# Patient Record
Sex: FEMALE | Race: WHITE | Employment: UNEMPLOYED | URBAN - METROPOLITAN AREA
[De-identification: names, ages, dates, MRNs, and addresses within clinical notes are randomized per-mention and may not be internally consistent; named-entity substitution may affect disease eponyms.]

---

## 2017-11-03 ENCOUNTER — HOSPITAL ENCOUNTER (OUTPATIENT)
Dept: PHYSICAL THERAPY | Age: 22
Discharge: HOME | End: 2017-11-03
Payer: OTHER GOVERNMENT

## 2017-11-03 PROCEDURE — 97161 PT EVAL LOW COMPLEX 20 MIN: CPT

## 2017-11-03 PROCEDURE — 97140 MANUAL THERAPY 1/> REGIONS: CPT

## 2017-11-03 PROCEDURE — 97110 THERAPEUTIC EXERCISES: CPT

## 2017-11-03 NOTE — PROGRESS NOTES
PHYSICAL THERAPY - DAILY TREATMENT NOTE    Patient Name: Nelson Kemp        Date: 11/3/2017  : 1995   YES Patient  Verified  Visit #:     Insurance: Payor:  / Plan: Oj Nevarez DEPENDENTS / Product Type:  /      In time: 6059 Out time: 1100   Total Treatment Time: 30     TREATMENT AREA =  TMD     SUBJECTIVE  Pain Level (on 0 to 10 scale):  ie  / 10   Medication Changes/New allergies or changes in medical history, any new surgeries or procedures?     NO    If yes, update Summary List   Subjective Functional Status/Changes:  []  No changes reported     See POC          OBJECTIVE  Modality rationale:      min [] Estim, type:                                          []  att     []  unatt     []  w/US     []  w/ice    []  w/heat    min []  Mechanical Traction: type/lbs                                               []  pro   []  sup   []  int   []  cont    min []  Ultrasound, settings/location:      min []  Iontophoresis:  []  take home patch w/ dexamethazone    min                                []  in clinic w/ dexamethazone    min []  Ice     []  Heat     position:     min []  Other:      10 Min Manual Therapy: DTM to (R) suboccipitals, (L) mid cervical paraspinals, (R) UT/Ls, (L) masseter, MFR to (L) masseter      min Therapeutic Exercise:  [x]  See flow sheet   []  Other:      []  Added:     to improve (function):    []  Changed:     to improve (function):      8 min Patient Education:  YES  Reviewed HEP   []  Progressed/Changed HEP based on:   Reviewed HEP for lateral deviation, mandibular depression with lingual support, self massage to (L) masseter     Other Objective/Functional Measures:    See POC     Post Treatment Pain Level (on 0 to 10) scale:   ie  / 10     ASSESSMENT  []  See Progress Note/Recertification   Patient will continue to benefit from skilled therapy to address remaining functional deficits: Decreased ability to eat hard foods, carry on prolonged conversations   Progress toward goals / Updated goals:    See POC     PLAN  []  Upgrade activities as tolerated YES Continue plan of care   []  Discharge due to :    []  Other:      Therapist: Teto Pitts DPT, CIMT    Date: 11/3/2017 Time: 11:15 AM

## 2017-11-03 NOTE — PROGRESS NOTES
[facility] PHYSICAL THERAPY AT [facility] [address] [place], [address] - Phone: [phone]  Fax: [phone] / [address] [facility]  Patient Name: Bimal Landeros May : 1995   Treatment   Diagnosis: (R) UT pain, TMD Medical   Diagnosis: Trapezius strain [X60.037S]  TMJ (dislocation of temporomandibular joint) [S03.00XA]   Onset Date: 2016     Referral Source: Chiradha, Not On File, MD [place] [facility]): 11/3/2017   Prior Hospitalization: See medical history Provider #: [id number]   Prior Level of Function: No limitations with chewing food, holding conversations   Comorbidities: Hypothyroidism, tooth extraction, wisdom teeth extraction, tonsillectomy   Medications: Verified on Patient Summary List   The Plan of Care and following information is based on the information from the initial evaluation.   ==================================================================================  Assessment / key information:  Pt is a 19 yo female s/p incidence of TMD s/s approx 2-3 months ago after being fitted improperly for dental appliance. She presents with pain ranging from 2-9/10, located (L) TMD.  Pain is made worse with activity, prolonged talking, eating food, better with rest, distractions. C/S AROM: flexion 45 deg , extension 50 deg with mid cervical pain, LSB 15 deg, RSB 20 deg , LRotation 85 deg RRotation 90 deg . Palpation reveals TTP increase to (R) UT/LS, (L) masseter. Medial pterygoid, (L) TMD joint line. Posture mild forward head, loss of cervical lordosis Demonstrates mandibular depression of 20mm, (R) lateral deviation to 10mm, (L) lateral deviation to 15 mm. C/o (L) ear fullness - currently being seen by ENT for these s/s. Pt will benefit from PT interventions to address the aforementioned deficits and allow pt to return to OF.   Eval Complexity: History HIGH Complexity :3+ comorbidities / personal factors will impact the outcome/ POC ;  Examination  LOW Complexity : 1-2 Standardized tests and measures addressing body structure, function, activity limitation and / or participation in recreation ; Presentation LOW Complexity : Stable, uncomplicated ;  Decision Making MEDIUM Complexity : FOTO score of 26-74; Overall Complexity LOW     ==================================================================================  Problem List: pain affecting function, decrease ROM, decrease ADL/ functional abilitiies, decrease activity tolerance and decrease flexibility/ joint mobility   Treatment Plan may include any combination of the following: Therapeutic exercise, Therapeutic activities, Neuromuscular re-education, Physical agent/modality and Manual therapy  Patient / Family readiness to learn indicated by: asking questions, trying to perform skills and interest  Persons(s) to be included in education: patient (P)  Barriers to Learning/Limitations: no  Measures taken:    Patient Goal (s): Decrease pain and improve function   Patient self reported health status: good  Rehabilitation Potential: good   Short Term Goals: To be accomplished in  2  weeks:  1. Pt will be independent and compliant with HEP to decrease pain, increase ROM and return pt to PLOF. 2. Pt will note pain less than or equal to 5/10 at worst to allow increase in functional abilities. 3. Pt will demonstrate increase in mandibular depression to approx 30mm or 3 fingers to allow ability to eat a variety of foods   Long Term Goals: To be accomplished in  4  weeks:  1. Pt will be independent with Clenching Cessation program to allow reduced incidence of TMJ s/s  2. Pt will have full, pain-free AROM of (B) TMJ joints in all planes to increase to return to previous eating habits. 3. Pt will note < or = 2/10 pain with all mobility to improve comfort with ADLs.    Frequency / Duration:   Patient to be seen  2-3  times per week for 4 weeks:  Patient / Caregiver education and instruction: self care, activity modification and exercises  G-Codes (GP): na  Therapist Signature: Yong FRYET, CIMT Date: 83/5/8710   Certification Period: na Time: 11:17 AM   ===========================================================================================  I certify that the above Physical Therapy Services are being furnished while the patient is under my care. I agree with the treatment plan and certify that this therapy is necessary. Physician Signature:        Date:       Time:     Please sign and return to [facility] or you may fax the signed copy to [phone]. Thank you.

## 2017-11-07 ENCOUNTER — HOSPITAL ENCOUNTER (OUTPATIENT)
Dept: PHYSICAL THERAPY | Age: 22
Discharge: HOME | End: 2017-11-07
Payer: OTHER GOVERNMENT

## 2017-11-07 PROCEDURE — 97140 MANUAL THERAPY 1/> REGIONS: CPT

## 2017-11-07 PROCEDURE — 97110 THERAPEUTIC EXERCISES: CPT

## 2017-11-07 NOTE — PROGRESS NOTES
PHYSICAL THERAPY - DAILY TREATMENT NOTE      Patient Name: Huy Barahona        Date: 2017  : 1995   YES Patient  Verified  Visit #:   2   Of  12  Insurance: Payor:  / Plan: [employer] [facility] [address] AND DEPENDENTS / Product Type:  /      In time: 10:05 Out time: 10:45   Total Treatment Time: 40     Medicare Time Tracking (below)   Total Timed Codes (min):  n/a 1:1 Treatment Time:  n/a     TREATMENT AREA =  Trapezius strain [L87.804H]  TMJ (dislocation of temporomandibular joint) [S03.00XA]    SUBJECTIVE    Pain Level (on 0 to 10 scale):  3  / 10   Medication Changes/New allergies or changes in medical history, any new surgeries or procedures?     NO    If yes, update Summary List   Subjective Functional Status/Changes:  []  No changes reported       Functional improvements: none reported   Functional impairments: chewing         OBJECTIVE  Modalities Rationale:     decrease pain to improve patient's ability to chew      min [] Estim, type/location:                                      []  att     []  unatt     []  w/US     []  w/ice    []  w/heat    min []  Mechanical Traction: type/lbs                   []  pro   []  sup   []  int   []  cont    []  before manual    []  after manual    min []  Ultrasound, settings/location:      min []  Iontophoresis w/ dexamethasone, location:                                               []  take home patch       []  in clinic   10 min []  Ice     [x]  Heat    location/position: Supine cervical    min []  Vasopneumatic Device, press/temp:     min []  Other:    [x] Skin assessment post-treatment (if applicable):    [x]  intact    []  redness- no adverse reaction     []redness  adverse reaction:      10 min Therapeutic Exercise:  [x]  See flow sheet   Rationale:      increase ROM to improve the patients ability to chew     20 min Manual Therapy: Technique:      [] S/DTM []IASTM []PROM [] Passive Stretching   []manual TPR    []Jt manipulation:Gr I [] II []  III [] IV[] V[]  Treatment Area:  SOR, DTM cervical paraspinals, UT,SCM, masseters; manual traction and UT stretch   Rationale:      decrease pain and increase ROM to improve patient's ability to chew     min Patient Education:  YES  Reviewed HEP   []  Progressed/Changed HEP based on: Other Objective/Functional Measures:    No complaints of increased pain with therex. Post Treatment Pain Level (on 0 to 10) scale:   2  / 10     ASSESSMENT    Assessment/Changes in Function:     Continued complaints of pain and loss of jaw opening. []  See Progress Note/Recertification   Patient will continue to benefit from skilled PT services to modify and progress therapeutic interventions, address functional mobility deficits, address ROM deficits, address strength deficits, analyze and address soft tissue restrictions, analyze and cue movement patterns, analyze and modify body mechanics/ergonomics and assess and modify postural abnormalities to attain remaining goals. to attain remaining goals. Progress toward goals / Updated goals:    Progress limited by pain.      PLAN    [x]  Upgrade activities as tolerated YES Continue plan of care   []  Discharge due to :    []  Other:      Therapist: Jhonny Gillespie PT    Date: 11/7/2017 Time: 10:58 AM   Future Appointments  Date Time Provider Mery Sparrow   11/9/2017 10:00 AM Sangeeta Delarosa PT REHAB [facility] AT [facility]   11/16/2017 10:00 AM Sangeeta Delarosa PT [facility] AT [facility]   11/21/2017 10:00 AM Sangeeta Delarosa PT [facility] AT [facility]   11/28/2017 10:00 AM Sangeeta Delarosa PT [facility] AT [facility]   11/30/2017 10:30 AM Sangeeta Delarosa PT REHAB [facility]ENTER AT [facility]

## 2017-11-16 ENCOUNTER — HOSPITAL ENCOUNTER (OUTPATIENT)
Dept: PHYSICAL THERAPY | Age: 22
Discharge: HOME | End: 2017-11-16
Payer: OTHER GOVERNMENT

## 2017-11-16 PROCEDURE — 97110 THERAPEUTIC EXERCISES: CPT

## 2017-11-16 PROCEDURE — 97140 MANUAL THERAPY 1/> REGIONS: CPT

## 2017-11-16 NOTE — PROGRESS NOTES
PHYSICAL THERAPY - DAILY TREATMENT NOTE      Patient Name: Maisha Rodríguez        Date: 2017  : 1995   YES Patient  Verified  Visit #:   4   of   12  Insurance: Payor:  / Plan: [employer]per DEPENDENTS / Product Type:  /      In time: 1005 Out time: 1045   Total Treatment Time: 40     Medicare Time Tracking (below)   Total Timed Codes (min):   1:1 Treatment Time:       TREATMENT AREA =  Trapezius strain [Q23.899W]  TMJ (dislocation of temporomandibular joint) [S03.00XA]    SUBJECTIVE    Pain Level (on 0 to 10 scale):  3  / 10   Medication Changes/New allergies or changes in medical history, any new surgeries or procedures?     NO    If yes, update Summary List   Subjective Functional Status/Changes:  []  No changes reported     Report pain in cervical region after lapse in performing HEP     OBJECTIVE  Modalities Rationale:     decrease pain and increase tissue extensibility to improve patient's ability to increase positional tolerance       min [] Estim, type/location:                                      []  att     []  unatt     []  w/US     []  w/ice    []  w/heat    min []  Mechanical Traction: type/lbs                   []  pro   []  sup   []  int   []  cont    []  before manual    []  after manual    min []  Ultrasound, settings/location:      min []  Iontophoresis w/ dexamethasone, location:                                               []  take home patch       []  in clinic   10 min []  Ice     [x]  Heat    location/position: Cs spine    min []  Vasopneumatic Device, press/temp:     min []  Other:    [x] Skin assessment post-treatment (if applicable):    [x]  intact    [x]  redness- no adverse reaction     []redness  adverse reaction:      20 min Therapeutic Exercise:  [x]  See flow sheet   Rationale:      increase ROM and increase strength to improve the patients ability to perform ADLs     10 min Manual Therapy: Technique:      [x] S/DTM []IASTM []PROM [x] Passive Stretching   [x]manual TPR    []Jt manipulation:Gr I [] II []  III [] IV[] V[]  Treatment Area:  DTM to (B) UT/Ls mid cervical paraspinals, manual traction to conclude   Rationale:      decrease pain, increase ROM and increase tissue extensibility to improve patient's ability to perform ADLs       min Gait Training:    Rationale:        throughout therapy min Patient Education:  YES  Reviewed HEP   []  Progressed/Changed HEP based on: Other Objective/Functional Measures:    Demonstrates increase in mm hypertonicity to (B) cervical paraspinals, no increase in pain with mandibular depression or deviation noted      Post Treatment Pain Level (on 0 to 10) scale:   2  / 10     ASSESSMENT    Assessment/Changes in Function:     Reduced pain levels post treatment, pt advised to con't with use of HEP for cervical exercises      []  See Progress Note/Recertification   Patient will continue to benefit from skilled PT services to modify and progress therapeutic interventions, address functional mobility deficits, address ROM deficits, address strength deficits and analyze and address soft tissue restrictions to attain remaining goals. Progress toward goals / Updated goals:     Will reassess ROM of cervical spine and TMD next visit      PLAN    []  Upgrade activities as tolerated YES Continue plan of care   []  Discharge due to :    []  Other:      Therapist: Anton Figueroa PT    Date: 11/16/2017 Time: 10:52 AM   Future Appointments  Date Time Provider Mery Sparrow   11/21/2017 10:00 AM Anton Figueroa PT [facility] AT [facility]   11/28/2017 10:00 AM Anton Figueroa PT [facility] AT [facility]   11/30/2017 10:30 AM Anton Figueroa PT REHAB [facility]ENTER AT [facility]

## 2017-11-21 ENCOUNTER — HOSPITAL ENCOUNTER (OUTPATIENT)
Dept: PHYSICAL THERAPY | Age: 22
Discharge: HOME | End: 2017-11-21
Payer: OTHER GOVERNMENT

## 2017-11-21 PROCEDURE — 97110 THERAPEUTIC EXERCISES: CPT

## 2017-11-21 PROCEDURE — 97140 MANUAL THERAPY 1/> REGIONS: CPT

## 2017-11-21 NOTE — PROGRESS NOTES
PHYSICAL THERAPY - DAILY TREATMENT NOTE      Patient Name: Purvi Lainez        Date: 2017  : 1995   YES Patient  Verified  Visit #:      of   12  Insurance: Payor:  / Plan: [employer] Casper DEPENDENTS / Product Type:  /      In time: 945 Out time: 1030   Total Treatment Time: 39     Medicare Time Tracking (below)   Total Timed Codes (min):   1:1 Treatment Time:       TREATMENT AREA =  Trapezius strain [F82.780M]  TMJ (dislocation of temporomandibular joint) [S03.00XA]    SUBJECTIVE    Pain Level (on 0 to 10 scale):  2  / 10   Medication Changes/New allergies or changes in medical history, any new surgeries or procedures?     NO    If yes, update Summary List   Subjective Functional Status/Changes:  []  No changes reported     Reports mild soreness in TMD region, overall better since last visit      OBJECTIVE  Modalities Rationale:     decrease inflammation and decrease pain to improve patient's ability to perform ADLs      min [] Estim, type/location:                                      []  att     []  unatt     []  w/US     []  w/ice    []  w/heat    min []  Mechanical Traction: type/lbs                   []  pro   []  sup   []  int   []  cont    []  before manual    []  after manual    min []  Ultrasound, settings/location:      min []  Iontophoresis w/ dexamethasone, location:                                               []  take home patch       []  in clinic   10 min []  Ice     [x]  Heat    location/position: Cs spine    min []  Vasopneumatic Device, press/temp:     min []  Other:    [x] Skin assessment post-treatment (if applicable):    [x]  intact    [x] no adverse reaction     []redness  adverse reaction:      25 min Therapeutic Exercise:  [x]  See flow sheet   Rationale:      increase ROM and increase strength to improve the patients ability to perform ADLs     10 min Manual Therapy: Technique:      [x] S/DTM []IASTM []PROM [x] Passive Stretching   [x]manual TPR    []Jt manipulation:Gr I [] II []  III [] IV[] V[]  Treatment Area:  DTM to (B) temporalis, masseter, suboccipitals, (B) UT, manual traction to conclude    Rationale:      decrease pain, increase ROM, increase tissue extensibility and decrease trigger points to improve patient's ability to perform ADLs       throughout therapy min Patient Education:  YES  Reviewed HEP   []  Progressed/Changed HEP based on: Other Objective/Functional Measures:    Demonstrates increase in hypertonicity with TPs to (B) temporalis and masseter mm with limited mandibular occlusion noted at onset of visit     Added new postural exercise per flow sheet      Post Treatment Pain Level (on 0 to 10) scale:   1  / 10     ASSESSMENT    Assessment/Changes in Function:     Demonstrates decrease in overall s/s during visit today, advised to con't with use of HEP for cervical mobility and TMD s/s   []  See Progress Note/Recertification   Patient will continue to benefit from skilled PT services to modify and progress therapeutic interventions, address functional mobility deficits, address ROM deficits, address strength deficits, analyze and address soft tissue restrictions and analyze and cue movement patterns to attain remaining goals.       Progress toward goals / Updated goals:    Progressing with LTGs, will reassess FOTO and GROC next visit      PLAN    []  Upgrade activities as tolerated YES Continue plan of care   []  Discharge due to :    []  Other:      Therapist: Sangeeta Delarosa PT    Date: 11/21/2017 Time: 10:20 AM   Future Appointments  Date Time Provider Mery Sparrow   11/28/2017 10:00 AM Sangeeta Delarosa PT REHAB [facility] AT [facility]   11/30/2017 10:30 AM Sangeeta Delarosa PT REHAB [facility]ENTER AT [facility]

## 2017-11-28 ENCOUNTER — HOSPITAL ENCOUNTER (OUTPATIENT)
Dept: PHYSICAL THERAPY | Age: 22
Discharge: HOME | End: 2017-11-28
Payer: OTHER GOVERNMENT

## 2017-11-28 PROCEDURE — 97110 THERAPEUTIC EXERCISES: CPT

## 2017-11-28 PROCEDURE — 97140 MANUAL THERAPY 1/> REGIONS: CPT

## 2017-11-28 NOTE — PROGRESS NOTES
PHYSICAL THERAPY - DAILY TREATMENT NOTE      Patient Name: Blade Vergara        Date: 2017  : 1995   YES Patient  Verified  Visit #:      12  Insurance: Payor:  / Plan: Oj Nevarez DEPENDENTS / Product Type:  /      In time: 950 Out time: 1030   Total Treatment Time: 40     Medicare Time Tracking (below)   Total Timed Codes (min):   1:1 Treatment Time:       TREATMENT AREA =  Trapezius strain [Y84.276H]  TMJ (dislocation of temporomandibular joint) [S03.00XA]    SUBJECTIVE    Pain Level (on 0 to 10 scale):  5  / 10   Medication Changes/New allergies or changes in medical history, any new surgeries or procedures?     NO    If yes, update Summary List   Subjective Functional Status/Changes:  []  No changes reported     Reports increase in (R) TMD pain after daughter had medical procedure over weekend resulting in an increase in clenching due to stress     OBJECTIVE  Modalities Rationale:     decrease inflammation and decrease pain to improve patient's ability to perform ADLs      min [] Estim, type/location:                                      []  att     []  unatt     []  w/US     []  w/ice    []  w/heat    min []  Mechanical Traction: type/lbs                   []  pro   []  sup   []  int   []  cont    []  before manual    []  after manual    min []  Ultrasound, settings/location:      min []  Iontophoresis w/ dexamethasone, location:                                               []  take home patch       []  in clinic   10 min [x]  Ice     []  Heat    location/position: Cs spine    min []  Vasopneumatic Device, press/temp:     min []  Other:    [x] Skin assessment post-treatment (if applicable):    [x]  intact    [x]  redness- no adverse reaction     []redness  adverse reaction:      20 min Therapeutic Exercise:  [x]  See flow sheet   Rationale:      increase ROM and increase strength to improve the patients ability to increase positional tolerance      10 min Manual Therapy: Technique:      [x] S/DTM []IASTM [x]PROM [] Passive Stretching   [x]manual TPR    []Jt manipulation:Gr I [] II []  III [] IV[] V[]  Treatment Area:  DTM to (R) masseter, medial pterygoid, intraoral TPr of (R) medial pterygoid, mandibular lateral deviations   Rationale:      decrease pain, increase ROM, increase tissue extensibility and decrease trigger points to improve patient's ability to increase ability to chew foods        Other Objective/Functional Measures:    Demonstrates increase in hypertonicity to (R) > (L) masseter with moderated TTP to (R) medial pterygoid, decrease in mandibular depression with (R) lateral deviation noted at onset of visit      Post Treatment Pain Level (on 0 to 10) scale:   2 / 10     ASSESSMENT    Assessment/Changes in Function:     Decrease in pain levels and increase in manibular depression from 30mm to 38mm at end of treatment session      []  See Progress Note/Recertification   Patient will continue to benefit from skilled PT services to modify and progress therapeutic interventions, address functional mobility deficits, address ROM deficits, address strength deficits and analyze and address soft tissue restrictions to attain remaining goals. Progress toward goals / Updated goals:     Will reassess FOTO and GROC next visit      PLAN    []  Upgrade activities as tolerated YES Continue plan of care   []  Discharge due to :    []  Other:      Therapist: Sangeeta Delarosa PT    Date: 11/28/2017 Time: 10:20 AM   Future Appointments  Date Time Provider Mery Sparrow   11/30/2017 10:30 AM Sangeeta Delarosa PT RE[facility] AT [facility]

## 2017-11-30 ENCOUNTER — HOSPITAL ENCOUNTER (OUTPATIENT)
Dept: PHYSICAL THERAPY | Age: 22
Discharge: HOME | End: 2017-11-30
Payer: OTHER GOVERNMENT

## 2017-11-30 PROCEDURE — 97110 THERAPEUTIC EXERCISES: CPT

## 2017-11-30 PROCEDURE — 97140 MANUAL THERAPY 1/> REGIONS: CPT

## 2017-11-30 NOTE — PROGRESS NOTES
PHYSICAL THERAPY - DAILY TREATMENT NOTE  -    Patient Name: Almaz Christian        Date: 2017  : 1995   YES Patient  Verified  Visit #:     Insurance: Payor:  / Plan: ConcepcionOj Casper DEPENDENTS / Product Type:  /      In time: 940 Out time: 1015   Total Treatment Time: 35     Medicare Time Tracking (below)   Total Timed Codes (min):   1:1 Treatment Time:       TREATMENT AREA =  Trapezius strain [G89.934B]  TMJ (dislocation of temporomandibular joint) [S03.00XA]    SUBJECTIVE    Pain Level (on 0 to 10 scale):  2  / 10   Medication Changes/New allergies or changes in medical history, any new surgeries or procedures? NO    If yes, update Summary List   Subjective Functional Status/Changes:  []  No changes reported     Reports decrease in s/s.  Overall reports ability to manage s/s with self release techniques, has noticed that it is difficult to stop clenching jaw in stressful situations     OBJECTIVE  Modalities Rationale:     decrease inflammation and decrease pain to improve patient's ability to increase ADL tolerance      min [] Estim, type/location:                                      []  att     []  unatt     []  w/US     []  w/ice    []  w/heat    min []  Mechanical Traction: type/lbs                   []  pro   []  sup   []  int   []  cont    []  before manual    []  after manual    min []  Ultrasound, settings/location:      min []  Iontophoresis w/ dexamethasone, location:                                               []  take home patch       []  in clinic   10 min [x]  Ice     []  Heat    location/position: Cs spine    min []  Vasopneumatic Device, press/temp:     min []  Other:    [x] Skin assessment post-treatment (if applicable):    [x]  intact    [x] no adverse reaction     []redness  adverse reaction:      15 min Therapeutic Exercise:  [x]  See flow sheet   Rationale:      increase ROM and increase strength to improve the patients ability to perform ADLs     10 min Manual Therapy: Technique:      [x] S/DTM []IASTM []PROM [] Passive Stretching   [x]manual TPR    []Jt manipulation:Gr I [] II []  III [] IV[] V[]  Treatment Area:  DTM to (B) temporalis, massester   Rationale:      decrease pain, increase ROM and increase tissue extensibility to improve patient's ability to perform ADLs      throughout therapy min Patient Education:  YES  Reviewed HEP   []  Progressed/Changed HEP based on: Other Objective/Functional Measures:    Demonstrates soreness in (B) masseters and temporalis      Post Treatment Pain Level (on 0 to 10) scale:   1  / 10     ASSESSMENT    Assessment/Changes in Function:     Reports decrease in pain and increased mandibular depression post treatment, pt advised to con't with use of self massage     FOTO score - 50, GROC 4+   []  See Progress Note/Recertification   Patient will continue to benefit from skilled PT services to modify and progress therapeutic interventions, address functional mobility deficits, address ROM deficits, address strength deficits and analyze and address soft tissue restrictions to attain remaining goals.       Progress toward goals / Updated goals:    Progressing with overall function will monitor and progress as able , LTG 1,3 MET      PLAN    []  Upgrade activities as tolerated YES Continue plan of care   []  Discharge due to :    []  Other:      Therapist: Andres Harris PT    Date: 11/30/2017 Time: 10:19 AM   Future Appointments  Date Time Provider Mery Sparrow   11/30/2017 10:30 AM Andres Harris, [facility]

## 2017-12-05 ENCOUNTER — HOSPITAL ENCOUNTER (OUTPATIENT)
Dept: PHYSICAL THERAPY | Age: 22
Discharge: HOME | End: 2017-12-05
Payer: OTHER GOVERNMENT

## 2017-12-05 PROCEDURE — 97110 THERAPEUTIC EXERCISES: CPT

## 2017-12-05 PROCEDURE — 97140 MANUAL THERAPY 1/> REGIONS: CPT

## 2017-12-05 NOTE — PROGRESS NOTES
[address] [facility] PHYSICAL THERAPY AT [facility] [address] [place], [address]  Phone: [phone]  Fax: [phone]  PROGRESS NOTE  Patient Name: Alex Cesar : 1995   Treatment/Medical Diagnosis: Trapezius strain [H39.010O]  TMJ (dislocation of temporomandibular joint) [S03.00XA]   Referral Source: [facility], Not On File, MD     Date of Initial Visit: 11/3/17 Attended Visits: 8 Missed Visits: 0     SUMMARY OF TREATMENT  Treatment focused on manual therapy and therex to improve cervical and jaw pain. CURRENT STATUS  Patient progressing slowy. Reporting 40% improvement in jaw pain; however increased incidence of cervical pain, most noticeable turning while driving. Pain 5/10 at worst.  Patient demonstrating full jaw AROM as follows: opening 50mm, protrusion 8 mm, lateral deviation 18 mm bilaterally. Cervical AROM as follows: flex 60 deg, ext 70 deg, rotation 70 deg bilaterally, left SB 40 deg, right 50 deg. Patient to be fitted with updated device on 17. FOTO Score 50 points. Previous Goals:  1. indpendent with clenching cessation program  2. Full pain free AROM bilateral TMJ  3. Pain <= 2/10   Prior Level/Current Level:  1) Prior Level: n/a   Current Level: continues clenching at night   Goal Met? no  2) Prior Level: pain 4/10   Current Level: pain 4/10   Goal Met? no  3) Prior Level: pain 9/10 at worst   Current Level: pain 5/10 at worst   Goal Met? no    New Goals to be achieved in __4__  weeks:  1. Patient will report pain 2/10 at worst to improve chewing tolerance. 2. Patient will increase FOTO Score to 66 points to improve tolerance to ADLs. 3. Patient will achieve +6 on GROC to improve sleep disturbance. G-Codes: n/a  RECOMMENDATIONS  Recommend continued PT for 4-8 visits to further improve jaw pain. If you have any questions/comments please contact us directly at [phone].    Thank you for allowing us to assist in the care of your patient. Therapist Signature: Kannan Ryder PT Date: 12/5/2017     Time: 10:32 AM   NOTE TO PHYSICIAN:  PLEASE COMPLETE THE ORDERS BELOW AND FAX TO   [facility] at [address]: [phone]. If you are unable to process this request in 24 hours please contact our office: [phone].    ___ I have read the above report and request that my patient continue as recommended.   ___ I have read the above report and request that my patient continue therapy with the following changes/special instructions:_________________________________________________________   ___ I have read the above report and request that my patient be discharged from therapy.      Physician Signature:        Date:       Time:

## 2017-12-05 NOTE — PROGRESS NOTES
PHYSICAL THERAPY - DAILY TREATMENT NOTE      Patient Name: Hubert Salinas        Date: 2017  : 1995   YES Patient  Verified  Visit #:   8   of   16  Insurance: Payor:  / Plan: Oj Nevarez DEPENDENTS / Product Type:  /      In time: 10:00 Out time: 10:50   Total Treatment Time: 50     Medicare Time Tracking (below)   Total Timed Codes (min):  n/a 1:1 Treatment Time:  n/a     TREATMENT AREA =  Trapezius strain [R19.666W]  TMJ (dislocation of temporomandibular joint) [S03.00XA]    SUBJECTIVE    Pain Level (on 0 to 10 scale):  4  / 10   Medication Changes/New allergies or changes in medical history, any new surgeries or procedures?     NO    If yes, update Summary List   Subjective Functional Status/Changes:  []  No changes reported       Functional improvements: less pain with ADLs  Functional impairments: neck pain turning while driving         OBJECTIVE  Modalities Rationale:     decrease pain to improve patient's ability to complete ADLs      min [] Estim, type/location:                                      []  att     []  unatt     []  w/US     []  w/ice    []  w/heat    min []  Mechanical Traction: type/lbs                   []  pro   []  sup   []  int   []  cont    []  before manual    []  after manual    min []  Ultrasound, settings/location:      min []  Iontophoresis w/ dexamethasone, location:                                               []  take home patch       []  in clinic   10 min []  Ice     [x]  Heat    location/position: Supine cervical    min []  Vasopneumatic Device, press/temp:     min []  Other:    [x] Skin assessment post-treatment (if applicable):    [x]  intact    []  redness- no adverse reaction     []redness  adverse reaction:      25 min Therapeutic Exercise:  [x]  See flow sheet   Rationale:      increase ROM and increase strength to improve the patients ability to complete ADLs     15 min Manual Therapy: Technique:      [] S/DTM []IASTM []PROM [] Passive Stretching   []manual TPR    []Jt manipulation:Gr I [] II []  III [] IV[] V[]  Treatment Area:  SOR, DTM cervical paraspinals, SCM, masseter   Rationale:      decrease pain, increase ROM and increase tissue extensibility to improve patient's ability to complete ADLs     min Patient Education:  YES  Reviewed HEP   []  Progressed/Changed HEP based on: Other Objective/Functional Measures:    See PN     Post Treatment Pain Level (on 0 to 10) scale:   2  / 10     ASSESSMENT    Assessment/Changes in Function:     See PN     [x]  See Progress Note/Recertification   Patient will continue to benefit from skilled PT services to modify and progress therapeutic interventions, address functional mobility deficits, address ROM deficits, address strength deficits, analyze and address soft tissue restrictions, analyze and cue movement patterns, analyze and modify body mechanics/ergonomics and assess and modify postural abnormalities to attain remaining goals. to attain remaining goals.    Progress toward goals / Updated goals:    See PN     PLAN    [x]  Upgrade activities as tolerated YES Continue plan of care   []  Discharge due to :    []  Other:      Therapist: Santosh Calhoun PT    Date: 12/5/2017 Time: 10:32 AM   Future Appointments  Date Time Provider Mery Sparrow   12/8/2017 10:00 AM Andres Harris PT [facility]   12/12/2017 11:30 AM Santosh Calhoun PT [facility]   12/15/2017 10:00 AM Andres Harris PT [facility]   12/27/2017 4:00 PM Santosh Calhoun PT [facility]

## 2017-12-08 ENCOUNTER — HOSPITAL ENCOUNTER (OUTPATIENT)
Dept: PHYSICAL THERAPY | Age: 22
Discharge: HOME | End: 2017-12-08
Payer: OTHER GOVERNMENT

## 2017-12-08 PROCEDURE — 97110 THERAPEUTIC EXERCISES: CPT

## 2017-12-08 PROCEDURE — 97140 MANUAL THERAPY 1/> REGIONS: CPT

## 2017-12-08 NOTE — PROGRESS NOTES
PHYSICAL THERAPY - DAILY TREATMENT NOTE      Patient Name: Stormy Chavez        Date: 2017  : 1995   YES Patient  Verified  Visit #:     Insurance: Payor:  / Plan: Oj Nevarez DEPENDENTS / Product Type:  /      In time: 1000 Out time: 1045   Total Treatment Time: 39     Medicare Time Tracking (below)   Total Timed Codes (min):   1:1 Treatment Time:       TREATMENT AREA =  Trapezius strain [G31.054D]  TMJ (dislocation of temporomandibular joint) [S03.00XA]    SUBJECTIVE    Pain Level (on 0 to 10 scale):  3  / 10   Medication Changes/New allergies or changes in medical history, any new surgeries or procedures?     NO    If yes, update Summary List   Subjective Functional Status/Changes:  []  No changes reported     Reports soreness in cervical region at onset of visit      OBJECTIVE  Modalities Rationale:     decrease inflammation and decrease pain to improve patient's ability to perform ADLs, increase positional tolerance       min [] Estim, type/location:                                      []  att     []  unatt     []  w/US     []  w/ice    []  w/heat    min []  Mechanical Traction: type/lbs                   []  pro   []  sup   []  int   []  cont    []  before manual    []  after manual    min []  Ultrasound, settings/location:      min []  Iontophoresis w/ dexamethasone, location:                                               []  take home patch       []  in clinic   10 min [x]  Ice     []  Heat    location/position: Cs spine    min []  Vasopneumatic Device, press/temp:     min []  Other:    [x] Skin assessment post-treatment (if applicable):    [x]  intact    []  redness- no adverse reaction     []redness  adverse reaction:      25 min Therapeutic Exercise:  [x]  See flow sheet   Rationale:      increase ROM and increase strength to improve the patients ability to perform ADLs      10 min Manual Therapy: Technique:      [x] S/DTM []IASTM [x]PROM [] Passive Stretching   [x]manual TPR    []Jt manipulation:Gr I [] II []  III [] IV[] V[]  Treatment Area:  SOR, DTM cervical paraspinals, SCM , masseter, ,manual traction    Rationale:      decrease pain, increase ROM, increase tissue extensibility and decrease trigger points to improve patient's ability to perform ADls       min Gait Training:    Rationale:        throughout therapy min Patient Education:  YES  Reviewed HEP   []  Progressed/Changed HEP based on: Other Objective/Functional Measures:    Demonstrates increase in (R) cervical paraspinal tone, upper cervical tone at onset of visit limited C2 LR at onset or visit      Post Treatment Pain Level (on 0 to 10) scale:   2  / 10     ASSESSMENT    Assessment/Changes in Function:     Reports decrease in upper cervical pain at end of treatment session, advised to con't to monitor for clenching during daily activities to reduced TMD stress      []  See Progress Note/Recertification   Patient will continue to benefit from skilled PT services to modify and progress therapeutic interventions, address functional mobility deficits, address ROM deficits, address strength deficits and analyze and address soft tissue restrictions to attain remaining goals.       Progress toward goals / Updated goals:    Slow progress with goals      PLAN    []  Upgrade activities as tolerated YES Continue plan of care   []  Discharge due to :    []  Other:      Therapist: Ofe Mckeon PT    Date: 12/8/2017 Time: 10:35 AM   Future Appointments  Date Time Provider Mery Sparrow   12/12/2017 11:30 AM Maikel Nieves PT [facility]   12/15/2017 10:00 AM Ofe Mckeon PT [facility]   12/27/2017 4:00 PM Maikel Nieves PT [facility]

## 2017-12-12 ENCOUNTER — HOSPITAL ENCOUNTER (OUTPATIENT)
Dept: PHYSICAL THERAPY | Age: 22
Discharge: HOME | End: 2017-12-12
Payer: OTHER GOVERNMENT

## 2017-12-12 PROCEDURE — 97110 THERAPEUTIC EXERCISES: CPT

## 2017-12-12 PROCEDURE — 97140 MANUAL THERAPY 1/> REGIONS: CPT

## 2017-12-12 NOTE — PROGRESS NOTES
PHYSICAL THERAPY - DAILY TREATMENT NOTE  -    Patient Name: Gerson Palma        Date: 2017  : 1995   YES Patient  Verified  Visit #:   10   of   16  Insurance: Payor:  / Plan: KnipOj Casper DEPENDENTS / Product Type:  /      In time: 11:25 Out time: 12:10   Total Treatment Time: 39     Medicare Time Tracking (below)   Total Timed Codes (min):  n/a 1:1 Treatment Time:  n/a     TREATMENT AREA =  Trapezius strain [B42.506J]  TMJ (dislocation of temporomandibular joint) [S03.00XA]    SUBJECTIVE    Pain Level (on 0 to 10 scale):  2  / 10   Medication Changes/New allergies or changes in medical history, any new surgeries or procedures?     NO    If yes, update Summary List   Subjective Functional Status/Changes:  []  No changes reported       Functional improvements: driving  Functional impairments: chewing         OBJECTIVE  Modalities Rationale:     decrease pain to improve patient's ability to chew      min [] Estim, type/location:                                      []  att     []  unatt     []  w/US     []  w/ice    []  w/heat    min []  Mechanical Traction: type/lbs                   []  pro   []  sup   []  int   []  cont    []  before manual    []  after manual    min []  Ultrasound, settings/location:      min []  Iontophoresis w/ dexamethasone, location:                                               []  take home patch       []  in clinic   10 min []  Ice     [x]  Heat    location/position: Supine cervical    min []  Vasopneumatic Device, press/temp:     min []  Other:    [x] Skin assessment post-treatment (if applicable):    [x]  intact    []  redness- no adverse reaction     []redness  adverse reaction:      20 min Therapeutic Exercise:  [x]  See flow sheet   Rationale:      increase ROM and increase strength to improve the patients ability to chew     15 min Manual Therapy: Technique:      [] S/DTM []IASTM []PROM [] Passive Stretching   []manual TPR    []Jt manipulation:Gr I [] II []  III [] IV[] V[]  Treatment Area:  SOR, DTM cervical paraspinals, UT, levator, masseter   Rationale:      decrease pain and increase tissue extensibility to improve patient's ability to chew     min Patient Education:  YES  Reviewed HEP   []  Progressed/Changed HEP based on: Other Objective/Functional Measures:    Increased cervical rotation AROM bilaterally with less pain at end range. Post Treatment Pain Level (on 0 to 10) scale:   1  / 10     ASSESSMENT    Assessment/Changes in Function:     Progressing well with cervical pain. []  See Progress Note/Recertification   Patient will continue to benefit from skilled PT services to modify and progress therapeutic interventions, address functional mobility deficits, address ROM deficits, address strength deficits, analyze and address soft tissue restrictions, analyze and cue movement patterns, analyze and modify body mechanics/ergonomics and assess and modify postural abnormalities to attain remaining goals. to attain remaining goals. Progress toward goals / Updated goals:    Progressing toward pain goals.      PLAN    [x]  Upgrade activities as tolerated YES Continue plan of care   []  Discharge due to :    []  Other:      Therapist: Lucila Rosario, PT    Date: 12/12/2017 Time: 12:09 PM   Future Appointments  Date Time Provider Mery Sparrow   12/15/2017 10:00 AM Gage Melendez, PT [facility]   12/27/2017 4:00 PM Lucila Rosario, PT [facility]

## 2017-12-15 ENCOUNTER — APPOINTMENT (OUTPATIENT)
Dept: PHYSICAL THERAPY | Age: 22
End: 2017-12-15
Payer: OTHER GOVERNMENT

## 2017-12-27 ENCOUNTER — HOSPITAL ENCOUNTER (OUTPATIENT)
Dept: PHYSICAL THERAPY | Age: 22
Discharge: HOME | End: 2017-12-27
Payer: OTHER GOVERNMENT

## 2017-12-27 PROCEDURE — 97110 THERAPEUTIC EXERCISES: CPT

## 2017-12-27 PROCEDURE — 97140 MANUAL THERAPY 1/> REGIONS: CPT

## 2017-12-27 NOTE — PROGRESS NOTES
PHYSICAL THERAPY - DAILY TREATMENT NOTE      Patient Name: Meghan Monahan        Date: 2017  : 1995   YES Patient  Verified  Visit #:      of   16  Insurance: Payor:  / Plan: ConcepcionOj Casper DEPENDENTS / Product Type:  /      In time: 4:00 Out time: 4:45   Total Treatment Time: 39     Medicare Time Tracking (below)   Total Timed Codes (min):  n/a 1:1 Treatment Time:  n/a     TREATMENT AREA =  Trapezius strain [V55.873W]  TMJ (dislocation of temporomandibular joint) [S03.00XA]    SUBJECTIVE    Pain Level (on 0 to 10 scale):  2  / 10   Medication Changes/New allergies or changes in medical history, any new surgeries or procedures?     NO    If yes, update Summary List   Subjective Functional Status/Changes:  []  No changes reported       Functional improvements: less jaw pain with chewing  Functional impairments: cervical pain with driving         OBJECTIVE  Modalities Rationale:     decrease pain to improve patient's ability to drive      min [] Estim, type/location:                                      []  att     []  unatt     []  w/US     []  w/ice    []  w/heat    min []  Mechanical Traction: type/lbs                   []  pro   []  sup   []  int   []  cont    []  before manual    []  after manual    min []  Ultrasound, settings/location:      min []  Iontophoresis w/ dexamethasone, location:                                               []  take home patch       []  in clinic   10 min []  Ice     [x]  Heat    location/position: Supine cervical    min []  Vasopneumatic Device, press/temp:     min []  Other:    [x] Skin assessment post-treatment (if applicable):    [x]  intact    []  redness- no adverse reaction     []redness  adverse reaction:      20 min Therapeutic Exercise:  [x]  See flow sheet   Rationale:      increase ROM and increase strength to improve the patients ability to drive     15 min Manual Therapy: Technique:      [] S/DTM []IASTM []PROM [] Passive Stretching   []manual TPR    []Jt manipulation:Gr I [] II []  III [] IV[] V[]  Treatment Area:  SOR,DTM cervical paraspinals, SCM, UT, masseter   Rationale:      decrease pain, increase ROM and increase tissue extensibility to improve patient's ability to drive       min Patient Education:  YES  Reviewed HEP   []  Progressed/Changed HEP based on: Other Objective/Functional Measures:    Decreased complaints of jaw pain. Post Treatment Pain Level (on 0 to 10) scale:   1  / 10     ASSESSMENT    Assessment/Changes in Function:     Patient followed up with MD, however was unable to achieve a proper fot for  and is scheduled for a refitting 1/18/18. []  See Progress Note/Recertification   Patient will continue to benefit from skilled PT services to modify and progress therapeutic interventions, address functional mobility deficits, address ROM deficits, address strength deficits, analyze and address soft tissue restrictions, analyze and cue movement patterns, analyze and modify body mechanics/ergonomics and assess and modify postural abnormalities to attain remaining goals. to attain remaining goals. Progress toward goals / Updated goals:    Progressing toward pain goals. PLAN    [x]  Upgrade activities as tolerated YES Continue plan of care   []  Discharge due to :    []  Other:      Therapist: Teto Damon PT    Date: 12/27/2017 Time: 4:26 PM   No future appointments.

## 2018-01-02 ENCOUNTER — APPOINTMENT (OUTPATIENT)
Dept: PHYSICAL THERAPY | Age: 23
End: 2018-01-02

## 2018-03-07 NOTE — PROGRESS NOTES
[address]  [facility] PHYSICAL THERAPY AT [facility] [address] [place], [address]  Phone: [phone]  Fax: [phone] SUMMARY  Patient Name: Wolf Moreland May : 1995   Treatment/Medical Diagnosis: Trapezius strain [V73.388Q]  TMJ (dislocation of temporomandibular joint) [S03.00XA]   Referral Source: Ruchi Saldana DDS     Date of Initial Visit: 11/3/17 Attended Visits: 11 Missed Visits: 0     SUMMARY OF TREATMENT  Treatment consisted of manual therapy and therex to improve jaw pain. CURRENT STATUS  Patient attended 11 visits with good progress, however did not complete plan of care. Unable to assess status at discharge due to lack of attendance. Discharge at this time. RECOMMENDATIONS  Discontinue therapy due to lack of attendance or compliance. If you have any questions/comments please contact us directly at [phone]. Thank you for allowing us to assist in the care of your patient.     Therapist Signature: Chad Mix PT Date: 3/7/18     Time: 2:46 PM